# Patient Record
Sex: FEMALE | Race: WHITE | NOT HISPANIC OR LATINO | Employment: OTHER | URBAN - METROPOLITAN AREA
[De-identification: names, ages, dates, MRNs, and addresses within clinical notes are randomized per-mention and may not be internally consistent; named-entity substitution may affect disease eponyms.]

---

## 2017-02-04 ENCOUNTER — HOSPITAL ENCOUNTER (OUTPATIENT)
Dept: RADIOLOGY | Age: 62
Discharge: HOME | End: 2017-02-04
Payer: COMMERCIAL

## 2017-02-04 DIAGNOSIS — M81.0 AGE-RELATED OSTEOPOROSIS WITHOUT CURRENT PATHOLOGICAL FRACTURE: ICD-10-CM

## 2017-02-04 PROCEDURE — 76977 US BONE DENSITY MEASURE: CPT

## 2017-02-06 ENCOUNTER — GENERIC CONVERSION - ENCOUNTER (OUTPATIENT)
Dept: OTHER | Facility: OTHER | Age: 62
End: 2017-02-06

## 2017-06-13 ENCOUNTER — GENERIC CONVERSION - ENCOUNTER (OUTPATIENT)
Dept: OTHER | Facility: OTHER | Age: 62
End: 2017-06-13

## 2017-11-22 ENCOUNTER — ALLSCRIPTS OFFICE VISIT (OUTPATIENT)
Dept: OTHER | Facility: OTHER | Age: 62
End: 2017-11-22

## 2017-11-22 PROCEDURE — G0145 SCR C/V CYTO,THINLAYER,RESCR: HCPCS | Performed by: OBSTETRICS & GYNECOLOGY

## 2017-11-23 NOTE — PROGRESS NOTES
Assessment    1  Visit for screening mammogram (V76 12) (Z12 31)   2  Encounter for routine gynecological examination (V72 31) (Z01 419)  Pelvic exam revealed the uterus to be anterior mobile normal size and well supported  No ovarian masses are detected  Cervix is normal to appearance  There is no blood or discharge in the vagina  The vulva is normal  Rectal exam shows no blood or masses in the rectum and no nodularity in the cul-de-sac  Breast exam is normal    Plan  Encounter for routine gynecological examination    · (1) THIN PREP PAP WITH IMAGING; Status:Active - Retrospective Authorization; Requested for:[id number];    Perform:[facility]; JLQ:[id number]; Last Updated By:Frantz Sanderson; 11/22/2017 11:26:01 AM;Ordered;for routine gynecological examination; Ordered By:Roberth Rangel; Maturation index required? : No  :   HPV? : if ASCUS   · Follow-up visit in 1 year Evaluation and Treatment  Follow-up  Status: Hold For -Scheduling  Requested for: 23EFG3880   Ordered; For: Encounter for routine gynecological examination; Ordered By: Chidi Yanez Performed:  Due: 59HFS4256  Encounter for screening mammogram for malignant neoplasm of breast    · * MAMMO SCREENING BILATERAL W CAD; Status:Active; Requested DXW:[id number]; Perform:[facility]; ENS:98PKH3553;MZPISTG;TTW screening mammogram for malignant neoplasm of breast; Ordered By:Roberth Rangel; A cervical Pap smear was taken at this visit  The patient was given a slip for bilateral screening mammogram to be performed after June 12, 2018  She will continue with Estrace vaginal cream 1 g in the vagina twice a week  She will return in one year for follow-up exam    Discussion/Summary    The patient was informed that her breast and pelvic exam are normal  She will call she sees any vaginal bleeding over the next year        Chief Complaint  annual exam      History of Present Illness  HPI: This 78-year-old patient has had no vaginal bleeding or episodes of vaginitis over the past year  She does use Estrace vaginal cream 1 g in the vagina twice a week  She has no pain with intercourse  She does have mild hot flashes but no chronic headaches or fainting spells  She did have a heel osteoporosis test this past year which was in the normal range  She had a mammogram June 13, 2017 which was normal  Her last Pap smear was normal  She does have a blood pressure 144/94 today  I did suggest that she see an internist to evaluate this  She sleeps about 7 hours at night  She has normal bowel and bladder function  GYN , Adult Female [facility]:   General Health:  Lifestyle:  She does not use tobacco -- She consumes alcohol -- She denies drug use  Reproductive health: the patient is postmenopausal--   she reports no menstrual problems  -- she uses no contraception  -- she is sexually active  -- pregnancy history:  Screening: Cervical cancer screening includes a pap smear performed 11/23/2016  Breast cancer screening includes a mammogram performed 6/13/2017  Colorectal cancer screening includes uncertain timing of the last colonoscopy  Review of Systems   Constitutional: No fever, no chills, feels well, no tiredness, no recent weight gain or loss  ENT: no ear ache, no loss of hearing, no nosebleeds or nasal discharge, no sore throat or hoarseness  Cardiovascular: no complaints of slow or fast heart rate, no chest pain, no palpitations, no leg claudication or lower extremity edema  Respiratory: no complaints of shortness of breath, no wheezing, no dyspnea on exertion, no orthopnea or PND  Breasts: no complaints of breast pain, breast lump or nipple discharge  Gastrointestinal: no complaints of abdominal pain, no constipation, no nausea or diarrhea, no vomiting, no bloody stools  Genitourinary: no complaints of dysuria, no incontinence, no pelvic pain, no dysmenorrhea, no vaginal discharge or abnormal vaginal bleeding  Musculoskeletal: no complaints of arthralgia, no myalgia, no joint swelling or stiffness, no limb pain or swelling  Integumentary: no complaints of skin rash or lesion, no itching or dry skin, no skin wounds  Neurological: no complaints of headache, no confusion, no numbness or tingling, no dizziness or fainting  Active Problems    1  Age related osteoporosis (733 01) (M81 0)   2  Candidiasis (112 9) (B37 9)   3  Encounter for routine gynecological examination (V72 31) (Z01 419)   4  Encounter for screening mammogram for malignant neoplasm of breast (V76 12) (Z12 31)   5  Menopause (627 2) (Z78 0)   6  Vaginitis, atrophic (627 3) (N95 2)   7  Visit for screening mammogram (V76 12) (Z12 31)    Past Medical History   · History of Asymptomatic menopausal state (V49 81) (Z78 0)    Family History  Mother    · Family history of osteoporosis (V17 81) (Z80 61)  Father    · Family history of Diabetes Mellitus (V18 0)  Sister    · Family history of Asthma (V17 5)  Family History    · Family history of Carcinoma Of The Stomach (V16 0)   · Family history of Hypertensive Heart Disease (V17 49)   · Family history of Stroke Syndrome (V17 1)    Social History     · Never A Smoker    Current Meds   1  Estrace 0 1 MG/GM Vaginal Cream; INSERT 1 APPLICATORFUL INTRAVAGINALLY TWICE WEEKLY; Therapy: 67YHD6183 to (Evaluate:13Oct2016)  Requested for: 35LDR9965; Last Rx:19Oct2015 Ordered   2  Fluconazole 150 MG Oral Tablet; diflucan 150mg,,,take 1 today and repeat in 3 days; Therapy: 63IBV1200 to (Last Rx:07Mar2017)  Requested for: 06YRW5962 Ordered   3  Glucosamine Chondroit-Collagen Oral Capsule; Therapy: 60Pas1949 to Recorded   4  Restasis 0 05 % Ophthalmic Emulsion; Therapy: 68TLI4393 to (Last Rx:38Apd6044)  Requested for: 99BLE2049 Ordered   5  Vitamin D CAPS; Therapy: (Recorded:22Nov2017) to Recorded   6  Vitamin E 100 UNIT Oral Tablet; Therapy: 53Rpu0072 to Recorded    Allergies  1   No Known Drug Allergies    Vitals   Recorded: 13RKF1999 91:71NC   Systolic 670   Diastolic 94   Height 5 ft 4 in   Weight 155 lb    BMI Calculated 26 61   BSA Calculated 1 76   LMP        Physical Exam   Constitutional  General appearance: No acute distress, well appearing and well nourished  Neck  Neck: Normal, supple, trachea midline, no masses  Thyroid: Normal, no thyromegaly  Pulmonary  Respiratory effort: No increased work of breathing or signs of respiratory distress  Auscultation of lungs: Clear to auscultation  Cardiovascular  Auscultation of heart: Normal rate and rhythm, normal S1 and S2, no murmurs  Peripheral vascular exam: Normal pulses Throughout  Genitourinary  External genitalia: Normal and no lesions appreciated  Vagina: Normal, no lesions or dryness appreciated  Urethra: Normal    Urethral meatus: Normal    Bladder: Normal, soft, non-tender and no prolapse or masses appreciated  Cervix: Normal, no palpable masses  Uterus: Normal, non-tender, not enlarged, and no palpable masses  Anus, perineum, and rectum: Normal sphincter tone, no masses, and no prolapse  Chest  Breasts: Normal and no dimpling or skin changes noted  Abdomen  Abdomen: Normal, non-tender, and no organomegaly noted  Liver and spleen: No hepatomegaly or splenomegaly  Examination for hernias: No hernias appreciated  Lymphatic  Palpation of lymph nodes in neck, axillae, groin and/or other locations: No lymphadenopathy or masses noted  Skin  Skin and subcutaneous tissue: Normal skin turgor and no rashes     Palpation of skin and subcutaneous tissue: Normal    Psychiatric  Orientation to person, place, and time: Normal    Mood and affect: Normal        Signatures   Electronically signed by : Lois Johnson MD; Nov 22 2017 11:46AM EST                       (Author)

## 2017-11-24 ENCOUNTER — LAB REQUISITION (OUTPATIENT)
Dept: LAB | Facility: HOSPITAL | Age: 62
End: 2017-11-24
Payer: COMMERCIAL

## 2017-11-24 DIAGNOSIS — Z01.419 ENCOUNTER FOR GYNECOLOGICAL EXAMINATION WITHOUT ABNORMAL FINDING: ICD-10-CM

## 2017-12-04 LAB
LAB AP GYN PRIMARY INTERPRETATION: NORMAL
Lab: NORMAL

## 2017-12-05 ENCOUNTER — GENERIC CONVERSION - ENCOUNTER (OUTPATIENT)
Dept: OTHER | Facility: OTHER | Age: 62
End: 2017-12-05

## 2018-01-10 NOTE — MISCELLANEOUS
Message   Recorded as Task   Date: 03/07/2017 03:18 PM, Created By: Lindsay Jorgensen   Task Name: Medical Complaint Callback   Assigned To: Marybeth Charlette   Regarding Patient: Shannan Moncada, Status: In Progress   Comment:    Cynthia Flores - 07 Mar 2017 3:18 PM     TASK CREATED  Caller: Self; Medical Complaint; [phone] (Home)  Pt was taking antibiotics for bronchitis and now has yeast infection  Pt would like rx called in if possible   Bambi Cortez - 07 Mar 2017 3:21 PM     TASK IN PROGRESS   Bambi Cortez - 07 Mar 2017 3:26 PM     TASK EDITED  rx to her for fluconazole        Active Problems    1  Age related osteoporosis (733 01) (M81 0)   2  Candidiasis (112 9) (B37 9)   3  Encounter for routine gynecological examination (V72 31) (Z01 419)   4  Encounter for screening mammogram for malignant neoplasm of breast (V76 12)   (Z12 31)   5  Menopause (627 2) (Z78 0)   6  Vaginitis, atrophic (627 3) (N95 2)   7  Visit for screening mammogram (V76 12) (Z12 31)    Current Meds   1  Estrace 0 1 MG/GM Vaginal Cream; INSERT 1 APPLICATORFUL INTRAVAGINALLY   TWICE WEEKLY; Therapy: 35IUB5867 to (Evaluate:13Oct2016)  Requested for: 40MFK3758; Last   Rx:44Nrh8368 Ordered   2  Glucosamine Chondroit-Collagen Oral Capsule; Therapy: 74Eye4398 to Recorded   3  Restasis 0 05 % Ophthalmic Emulsion; Therapy: 75TBO9909 to (Last Rx:70Dwz8690)  Requested for: 79GNR7773 Ordered   4  Vitamin E 100 UNIT Oral Tablet; Therapy: 72Xff7819 to Recorded    Allergies    1   No Known Drug Allergies    Plan  Candidiasis    · Fluconazole 150 MG Oral Tablet; diflucan 150mg,,,take 1 today and repeat in 3  days    Signatures   Electronically signed by : Cheyenne Vick, ; Mar  7 2017  3:26PM EST                       (Author)

## 2018-01-12 NOTE — RESULT NOTES
Verified Results  (B) PAP (REFLEX TO HPV PLUS WHEN ASC-US) 67OLG8719 09:26AM Bobby Emerson     Test Name Result Flag Reference   PAP, LIQUID-BASED NILM     DIAGNOSIS:            Negative for intraepithelial lesion or malignancy  ADEQUACY:             Satisfactory for evaluation / No endocervical                         component/transformation zone  identified  COMMENT:              The absence of an endocervical component was                         confirmed by an additional screening evaluation  This Pap smear was screened with the assistance                         of the FashiontrotPrep(TM) Imaging System and                         screened by a cytotechnologist   SPECIMEN SOURCE:      PAP (RFLX HPV PLUS WHENASC-US), CERVIX  CLINICAL INFORMATION: LMP: N/A                        Post menopausal                        Provided Diagnosis Codes: V14 831                                                Cervicovaginal cytology should be considered a                         screening procedure subject to false negatives                         and false positives  Results are more reliable                         when a satisfactory sample is obtained on a                         regular repetitive basis, and should be                         interpreted together with past and current                         clinical data  ELECTRONICALLY SIGNED   BY:                   Screened By: WAN Hernandez (ASCP)   Case                         Electronically Signed 11/29/2016       Plan  Age related osteoporosis    · DXA [address]; Status:Active;  Requested for:29Nov2016;

## 2018-01-13 VITALS
BODY MASS INDEX: 26.46 KG/M2 | SYSTOLIC BLOOD PRESSURE: 144 MMHG | WEIGHT: 155 LBS | DIASTOLIC BLOOD PRESSURE: 94 MMHG | HEIGHT: 64 IN

## 2018-01-16 NOTE — MISCELLANEOUS
Message   Recorded as Task   Date: 02/06/2017 03:03 PM, Created By: Simone Duran   Task Name: Follow Up   Assigned To: Ceasar Fee   Regarding Patient: Lavanda Dakin, Status: In Progress   CommentSheryl Kolton - 06 Feb 2017 3:03 PM     TASK CREATED  Call Richie Botello her bone density test is normal    Marylene Noon - 06 Feb 2017 3:11 PM     TASK IN PROGRESS   Marylene Noon - 06 Feb 2017 3:12 PM     TASK EDITED  I lm of this for pt - 20 [facility] test        Active Problems    1  Age related osteoporosis (733 01) (M81 0)   2  Encounter for routine gynecological examination (V72 31) (Z01 419)   3  Encounter for screening mammogram for malignant neoplasm of breast (V76 12)   (Z12 31)   4  Menopause (627 2) (Z78 0)   5  Vaginitis, atrophic (627 3) (N95 2)   6  Visit for screening mammogram (V76 12) (Z12 31)    Current Meds   1  Estrace 0 1 MG/GM Vaginal Cream; INSERT 1 APPLICATORFUL INTRAVAGINALLY   TWICE WEEKLY; Therapy: 30LZM2755 to (Evaluate:94Lft4942)  Requested for: 09KPF6469; Last   Rx:69Ifc5753 Ordered   2  Glucosamine Chondroit-Collagen Oral Capsule; Therapy: 79Faq2033 to Recorded   3  Restasis 0 05 % Ophthalmic Emulsion; Therapy: 18TOQ6727 to (Last Rx:47Neu5124)  Requested for: 71XAN6799 Ordered   4  Vitamin E 100 UNIT Oral Tablet; Therapy: 74Zcm7232 to Recorded    Allergies    1  No Known Drug Allergies    Signatures   Electronically signed by :  Leela Al, ; Feb 6 2017  3:12PM EST                       (Author)

## 2018-01-16 NOTE — MISCELLANEOUS
Message   Recorded as Task   Date: 11/29/2016 11:41 AM, Created By: Carlitos Alexandre   Task Name: Follow Up   Assigned To: Nahid Ku   Regarding Patient: Yuriy Saldana, Status: In Progress   Kameron Lovell - 29 Nov 2016 11:41 AM     TASK CREATED  Call Tamra Whittington her vitamin D level is low she should take 2000 units of vitamin D daily  Iqra Isaac - 29 Nov 2016 11:41 AM     TASK IN PROGRESS   Iqra Isaac - 29 Nov 2016 11:42 AM     TASK EDITED  Miguel Montez - 29 Nov 2016 3:24 PM     TASK EDITED  made pt aware, she requested a heel scan rx in ehr number to ehr given to call and schedule apt  Active Problems    1  Age related osteoporosis (733 01) (M81 0)   2  Encounter for routine gynecological examination (V72 31) (Z01 419)   3  Encounter for screening mammogram for malignant neoplasm of breast (V76 12)   (Z12 31)   4  Menopause (627 2) (Z78 0)   5  Vaginitis, atrophic (627 3) (N95 2)   6  Visit for screening mammogram (V76 12) (Z12 31)    Current Meds   1  Estrace 0 1 MG/GM Vaginal Cream; INSERT 1 APPLICATORFUL INTRAVAGINALLY   TWICE WEEKLY; Therapy: 95JIG6134 to (Evaluate:13Oct2016)  Requested for: 60KHR0127; Last   Rx:19Oct2015 Ordered   2  Glucosamine Chondroit-Collagen Oral Capsule; Therapy: 22Jyd8143 to Recorded   3  Restasis 0 05 % Ophthalmic Emulsion; Therapy: 61XVT3371 to (Last Rx:11Klg8155)  Requested for: 97DAY1283 Ordered   4  Vitamin E 100 UNIT Oral Tablet; Therapy: 55Knq0800 to Recorded    Allergies    1  No Known Drug Allergies    Plan  Age related osteoporosis    · DXA US BONE DENSITY HEEL; Status:Hold For - Scheduling,Retrospective By Protocol  Authorization;  Requested for:29Nov2016;     Signatures   Electronically signed by : Stefania Willingham LPN; Nov 29 7551  6:53TG EST                       (Author)

## 2018-01-18 NOTE — RESULT NOTES
Verified Results  DXA [address] 84AWI1721 09:24AM Dudley Felix Order Number: [id number]    - Patient Instructions: To schedule this appointment, please contact Central Scheduling at [phone]  Test Name Result Flag Reference   DXA US BONE DENSITY HEEL (Report)     BONE SONOMETRY     INDICATION: Bone density screening     PROCEDURE: Ultrasound measurement of the right heel was performed using a [address]  FINDINGS:     Speed of sound: 1587 1 m/s    Broadband ultrasound attenuation: 123 3 dB/MHz    Quantitative ultrasound index/stiffness: 130 3    T score: 1 5    Estimated heel BMD: 0 747 g/cm2       IMPRESSION:     T score 1 5, within normal limits  No further evaluation recommended at this time  RECOMMENDATION:  Patients with T score of zero or lower should undergo central DEXA measurement to determine diagnostic category by WHO criteria  Workstation performed: [id number]     Signed by:   Mikki Yarbrough MD   2/6/17

## 2018-01-18 NOTE — RESULT NOTES
Message   Notify Bethalto Hazard a low vitamin D level     Verified Results  (1) VITAMIN D 25-HYDROXY 06ZJM2142 01:25PM Cathryn Jimenez Order Number: [id number]     Test Name Result Flag Reference   VIT D 25-HYDROX 24 8 ng/mL L 30 0-100 0   This assay is a certified procedure of the CDC Vitamin D Standardization Certification Program (VDSCP)     Deficiency <20ng/ml   Insufficiency 20-30ng/ml   Sufficient  ng/ml     *Patients undergoing fluorescein dye angiography may retain small amounts of fluorescein in the body for 48-72 hours post procedure  Samples containing fluorescein can produce falsely elevated Vitamin D values  If the patient had this procedure, a specimen should be resubmitted post fluorescein clearance

## 2018-01-23 NOTE — RESULT NOTES
Verified Results  (1) THIN PREP PAP WITH IMAGING 33YNX5472 08:14AM Brittney Barkley     Test Name Result Flag Reference   LAB AP CASE REPORT (Report)     Gynecologic Cytology Report            Case: [id number]                  Authorizing Provider: Corinne Garrido MD     Collected:      11/22/2017           First Screen:     WAN Bright Received:      11/27/2017 1440        Specimen:  LIQUID-BASED PAP, SCREENING, Cervix   LAB AP GYN PRIMARY INTERPRETATION      Negative for intraepithelial lesion or malignancy  Electronically signed by WAN Bright on 12/4/2017 at 8:14 AM   LAB AP GYN SPECIMEN ADEQUACY      Satisfactory for evaluation  Absence of endocervical/transformation zone component  LAB AP GYN ADDITIONAL INFORMATION (Report)     [employer] FDA approved ,  and ThinPrep Imaging System are   utilized with strict adherence to the 's instruction manual to   prepare gynecologic and non-gynecologic cytology specimens for the   production of ThinPrep slides as well as for gynecologic ThinPrep imaging  These processes have been validated by our laboratory and/or by the     The Pap test is not a diagnostic procedure and should not be used as the   sole means to detect cervical cancer  It is only a screening procedure to   aid in the detection of cervical cancer and its precursors  Both   false-negative and false-positive results have been experienced  Your   patient's test result should be interpreted in this context together with   the history and clinical findings     LAB AP LMP

## 2018-06-13 DIAGNOSIS — Z12.31 ENCOUNTER FOR SCREENING MAMMOGRAM FOR MALIGNANT NEOPLASM OF BREAST: ICD-10-CM

## 2018-06-26 DIAGNOSIS — B37.9 YEAST INFECTION: Primary | ICD-10-CM

## 2018-06-26 RX ORDER — FLUCONAZOLE 150 MG/1
TABLET ORAL
Qty: 2 TABLET | Refills: 0 | Status: SHIPPED | OUTPATIENT
Start: 2018-06-26 | End: 2018-06-30

## 2018-08-17 DIAGNOSIS — Z78.0 MENOPAUSE: Primary | ICD-10-CM

## 2018-08-17 RX ORDER — ESTRADIOL 0.1 MG/G
CREAM VAGINAL
COMMUNITY
Start: 2015-10-19 | End: 2018-08-17 | Stop reason: SDUPTHER

## 2018-08-17 RX ORDER — ESTRADIOL 0.1 MG/G
1 CREAM VAGINAL 2 TIMES WEEKLY
Qty: 42.5 G | Refills: 1 | Status: SHIPPED | OUTPATIENT
Start: 2018-08-20

## 2018-11-13 DIAGNOSIS — B37.9 YEAST INFECTION: Primary | ICD-10-CM

## 2018-11-14 RX ORDER — FLUCONAZOLE 150 MG/1
TABLET ORAL
Qty: 2 TABLET | Refills: 0 | Status: SHIPPED | OUTPATIENT
Start: 2018-11-14 | End: 2018-11-17

## 2018-11-14 NOTE — TELEPHONE ENCOUNTER
you cassandra QUARLESI of Proverahe but she alondra approve it does not mean it is okay dieting the [address] the [address] and I had a thehehe fromver come

## 2019-06-25 ENCOUNTER — TELEPHONE (OUTPATIENT)
Dept: OBGYN CLINIC | Facility: CLINIC | Age: 64
End: 2019-06-25

## 2019-06-25 DIAGNOSIS — N76.0 VAGINITIS AND VULVOVAGINITIS: Primary | ICD-10-CM

## 2019-06-25 RX ORDER — FLUCONAZOLE 150 MG/1
TABLET ORAL
Qty: 2 TABLET | Refills: 0 | Status: SHIPPED | OUTPATIENT
Start: 2019-06-25 | End: 2019-06-29

## 2019-07-29 ENCOUNTER — ANNUAL EXAM (OUTPATIENT)
Dept: OBGYN CLINIC | Facility: CLINIC | Age: 64
End: 2019-07-29
Payer: COMMERCIAL

## 2019-07-29 VITALS
DIASTOLIC BLOOD PRESSURE: 76 MMHG | SYSTOLIC BLOOD PRESSURE: 128 MMHG | BODY MASS INDEX: 27.16 KG/M2 | HEIGHT: 65 IN | WEIGHT: 163 LBS

## 2019-07-29 DIAGNOSIS — N60.01 SOLITARY CYST OF RIGHT BREAST: Primary | ICD-10-CM

## 2019-07-29 DIAGNOSIS — Z01.419 ENCOUNTER FOR GYNECOLOGICAL EXAMINATION (GENERAL) (ROUTINE) WITHOUT ABNORMAL FINDINGS: ICD-10-CM

## 2019-07-29 DIAGNOSIS — Z12.31 ENCOUNTER FOR SCREENING MAMMOGRAM FOR MALIGNANT NEOPLASM OF BREAST: ICD-10-CM

## 2019-07-29 PROCEDURE — S0612 ANNUAL GYNECOLOGICAL EXAMINA: HCPCS | Performed by: OBSTETRICS & GYNECOLOGY

## 2019-07-29 PROCEDURE — G0145 SCR C/V CYTO,THINLAYER,RESCR: HCPCS | Performed by: OBSTETRICS & GYNECOLOGY

## 2019-07-29 RX ORDER — LEVOTHYROXINE SODIUM 0.05 MG/1
TABLET ORAL DAILY
COMMUNITY

## 2019-07-29 RX ORDER — ASCORBATE CALCIUM 500 MG
TABLET ORAL
COMMUNITY
Start: 2013-08-20

## 2019-07-29 RX ORDER — AMLODIPINE BESYLATE 2.5 MG/1
2.5 TABLET ORAL DAILY
Refills: 8 | COMMUNITY
Start: 2019-07-10 | End: 2021-11-24

## 2019-07-29 RX ORDER — CYCLOSPORINE 0.5 MG/ML
EMULSION OPHTHALMIC EVERY 12 HOURS
COMMUNITY
End: 2021-11-24

## 2019-07-29 NOTE — PATIENT INSTRUCTIONS
The patient was told that her breast and pelvic exam are normal   I will give her a prescription for Diflucan 150 mg take 1 stat and repeat in 48 hours with 6 refills  This can be used if she has had a Monilia vaginal infection after taking antibiotics  She will be seen in 1 year for followup gyn evaluation

## 2019-07-29 NOTE — PROGRESS NOTES
Assessment/Plan: This 69-year-old patient has had no vaginal bleeding over the past year  She occasionally has episodes of monilia vaginal infections especially after using antibiotics  She has taken Diflucan for relief of the symptoms  No problem-specific Assessment & Plan notes found for this encounter  Subjective:      Patient ID: Johnna Hawkins is a 70 y o  female  This 69-year-old patient has had no vaginal bleeding over the past year  She does have Monilia infections after taking antibiotics and receives Diflucan to treat this  [place] is comfortable  She does use estradiol vaginal cream 1 g in the vagina twice a week and receives a prescription from her PCP in [place]  She does have occasional hot flashes but no chronic headaches or fainting spells  Her bowel function is normal and she does not use laxatives on a regular basis  June 5, 2019 she did have a mammogram which showed a cyst in the right breast   It was recommended that she have a ultrasound of the right breast to re-evaluate this cyst in 6 months  Compared about a year and a half ago her weight did go up 8 lb to 163  Her blood pressure is 128/74 with the help of medication  She does not wear liners for urine stress incontinence  She has had no urinary tract infections or kidney stones over the past year  Review of Systems   Constitutional: Negative  HENT: Negative  Eyes: Negative  Respiratory: Negative  Cardiovascular: Negative  Gastrointestinal: Negative  Endocrine: Negative  Genitourinary: Negative  Musculoskeletal: Negative  Skin: Negative  Allergic/Immunologic: Negative  Neurological: Negative  Hematological: Negative  Psychiatric/Behavioral: Negative            Objective:      /76 (BP Location: Left arm, Patient Position: Sitting, Cuff Size: Standard)   Ht 5' 5" (1 651 m)   Wt 73 9 kg (163 lb)   BMI 27 12 kg/m²          Physical Exam   Constitutional: She is oriented to person, place, and time  She appears well-developed and well-nourished  HENT:   Head: Normocephalic  Neck: Normal range of motion  Neck supple  Cardiovascular: Normal rate, regular rhythm, normal heart sounds and intact distal pulses  Pulmonary/Chest: Effort normal and breath sounds normal    Abdominal: Soft  Bowel sounds are normal    Genitourinary: Uterus normal    Musculoskeletal: Normal range of motion  Neurological: She is alert and oriented to person, place, and time  Skin: Skin is warm and dry  Psychiatric: She has a normal mood and affect  Nursing note and vitals reviewed  breast exam is normal   Pelvic exam reveals uterus to be anterior mobile normal size and well supported  No adnexal masses are identified  The cervix is normal to appearance  There is no blood or discharge in the vagina  The vulva is normal   Rectal exam shows no masses or blood in the rectum and no nodularity in the cul-de-sac

## 2019-07-31 LAB
LAB AP GYN PRIMARY INTERPRETATION: NORMAL
Lab: NORMAL

## 2020-11-30 ENCOUNTER — ANNUAL EXAM (OUTPATIENT)
Dept: OBGYN CLINIC | Facility: CLINIC | Age: 65
End: 2020-11-30
Payer: COMMERCIAL

## 2020-11-30 VITALS
DIASTOLIC BLOOD PRESSURE: 115 MMHG | SYSTOLIC BLOOD PRESSURE: 150 MMHG | BODY MASS INDEX: 27.66 KG/M2 | HEIGHT: 65 IN | WEIGHT: 166 LBS

## 2020-11-30 DIAGNOSIS — Z12.11 SCREENING FOR COLON CANCER: ICD-10-CM

## 2020-11-30 DIAGNOSIS — N95.2 VAGINITIS, ATROPHIC: ICD-10-CM

## 2020-11-30 DIAGNOSIS — Z12.31 ENCOUNTER FOR SCREENING MAMMOGRAM FOR MALIGNANT NEOPLASM OF BREAST: ICD-10-CM

## 2020-11-30 DIAGNOSIS — I10 BENIGN HYPERTENSION: ICD-10-CM

## 2020-11-30 DIAGNOSIS — N60.09 BREAST CYST, UNSPECIFIED LATERALITY: ICD-10-CM

## 2020-11-30 DIAGNOSIS — Z01.419 ENCOUNTER FOR ANNUAL ROUTINE GYNECOLOGICAL EXAMINATION: Primary | ICD-10-CM

## 2020-11-30 DIAGNOSIS — E03.9 HYPOTHYROIDISM, UNSPECIFIED TYPE: ICD-10-CM

## 2020-11-30 PROCEDURE — S0612 ANNUAL GYNECOLOGICAL EXAMINA: HCPCS | Performed by: STUDENT IN AN ORGANIZED HEALTH CARE EDUCATION/TRAINING PROGRAM

## 2020-11-30 RX ORDER — AZELASTINE 1 MG/ML
SPRAY, METERED NASAL
COMMUNITY
Start: 2020-09-29 | End: 2020-11-30

## 2021-07-26 ENCOUNTER — LAB REQUISITION (OUTPATIENT)
Dept: LAB | Facility: HOSPITAL | Age: 66
End: 2021-07-26
Payer: COMMERCIAL

## 2021-07-26 DIAGNOSIS — Z12.11 ENCOUNTER FOR SCREENING FOR MALIGNANT NEOPLASM OF COLON: ICD-10-CM

## 2021-07-26 DIAGNOSIS — K57.30 DIVERTICULOSIS OF LARGE INTESTINE WITHOUT PERFORATION OR ABSCESS WITHOUT BLEEDING: ICD-10-CM

## 2021-07-26 DIAGNOSIS — D12.3 BENIGN NEOPLASM OF TRANSVERSE COLON: ICD-10-CM

## 2021-07-26 PROCEDURE — 88305 TISSUE EXAM BY PATHOLOGIST: CPT | Performed by: PATHOLOGY

## 2021-11-24 ENCOUNTER — ANNUAL EXAM (OUTPATIENT)
Dept: OBGYN CLINIC | Facility: CLINIC | Age: 66
End: 2021-11-24
Payer: COMMERCIAL

## 2021-11-24 VITALS
DIASTOLIC BLOOD PRESSURE: 94 MMHG | WEIGHT: 163.6 LBS | HEIGHT: 65 IN | SYSTOLIC BLOOD PRESSURE: 144 MMHG | BODY MASS INDEX: 27.26 KG/M2

## 2021-11-24 DIAGNOSIS — N95.2 VAGINITIS, ATROPHIC: ICD-10-CM

## 2021-11-24 DIAGNOSIS — Z13.820 SCREENING FOR OSTEOPOROSIS: ICD-10-CM

## 2021-11-24 DIAGNOSIS — Z12.31 ENCOUNTER FOR SCREENING MAMMOGRAM FOR MALIGNANT NEOPLASM OF BREAST: ICD-10-CM

## 2021-11-24 DIAGNOSIS — Z01.419 ENCOUNTER FOR ANNUAL ROUTINE GYNECOLOGICAL EXAMINATION: Primary | ICD-10-CM

## 2021-11-24 DIAGNOSIS — N60.09 BREAST CYST, UNSPECIFIED LATERALITY: ICD-10-CM

## 2021-11-24 DIAGNOSIS — E03.9 HYPOTHYROIDISM, UNSPECIFIED TYPE: ICD-10-CM

## 2021-11-24 DIAGNOSIS — Z78.0 POST-MENOPAUSAL: ICD-10-CM

## 2021-11-24 DIAGNOSIS — I10 BENIGN HYPERTENSION: ICD-10-CM

## 2021-11-24 PROCEDURE — S0612 ANNUAL GYNECOLOGICAL EXAMINA: HCPCS | Performed by: STUDENT IN AN ORGANIZED HEALTH CARE EDUCATION/TRAINING PROGRAM

## 2021-11-24 RX ORDER — EZETIMIBE 10 MG/1
TABLET ORAL
COMMUNITY
Start: 2021-11-15

## 2021-11-24 RX ORDER — CYCLOSPORINE 0 G/ML
SOLUTION/ DROPS OPHTHALMIC; TOPICAL
COMMUNITY
Start: 2021-09-14

## 2021-11-24 RX ORDER — AMLODIPINE BESYLATE 5 MG/1
TABLET ORAL
COMMUNITY
Start: 2021-11-13

## 2022-06-09 ENCOUNTER — TELEPHONE (OUTPATIENT)
Dept: OBGYN CLINIC | Facility: CLINIC | Age: 67
End: 2022-06-09

## 2022-06-09 DIAGNOSIS — Z13.820 SCREENING FOR OSTEOPOROSIS: ICD-10-CM

## 2022-06-09 DIAGNOSIS — Z12.31 ENCOUNTER FOR SCREENING MAMMOGRAM FOR MALIGNANT NEOPLASM OF BREAST: ICD-10-CM

## 2022-06-09 DIAGNOSIS — Z78.0 POST-MENOPAUSAL: ICD-10-CM

## 2022-06-09 NOTE — TELEPHONE ENCOUNTER
Left voice message after checking her communication consent of the following results:  DEXA scan shows low bone mineral density (osteopenia)  Recommend the following: DEXA scan to be repeated in 1-2 years, daily weight bearing exercises, avoidance of falls, alcohol ingestion in moderation and adequate calcium and Vit D intake  Please call office with any questions       Also notified of normal screening mammogram and recommendation to continue with annual mammogram

## 2022-06-09 NOTE — TELEPHONE ENCOUNTER
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

## 2023-06-08 NOTE — PROGRESS NOTES
Kala Chandler   1955    CC:  Yearly exam    A:  Yearly exam      Problem List Items Addressed This Visit    None  Visit Diagnoses     Encounter for screening mammogram for malignant neoplasm of breast    -  Primary    Relevant Orders    Mammo screening bilateral w 3d & cad    Menopause        Relevant Medications    estradiol (ESTRACE VAGINAL) 0 1 mg/g vaginal cream (Start on 2023)          P:   Pap testing up to date  We reviewed ASCCP guidelines for Pap testing  Mammo slip given    Colonoscopy due     RTO one year for yearly exam or sooner as needed  S:  69 y o  female here for yearly exam  She is postmenopausal and has had no vaginal bleeding  She denies vaginal discharge, itching, odor or dryness  Sexual activity: She is sexually active without pain, bleeding or dryness  Previously with dryness, prior to Estrace, but this keeps it managed well  STD testing: She does not want STD testing today  Menopausal    Last Pap: 2019 NILM    - no hx abnormal  Last Mammo: 2022 BIRADS 1  Last Colonoscopy: 2021, 5yr recall  Last DEXA: 2022 osteopenia  PCP would like to treat as osteoporosis and she is in the pre-authorization process    Doing well: spending the summer in the [facility] as her  is travelling with the [id number] and her daughter is pregnant       Non-smoker, social drinker    Family hx of breast/ovarian/colon cancer: denies      Current Outpatient Medications:   •  amLODIPine (NORVASC) 5 mg tablet, , Disp: , Rfl:   •  Calcium 600-10 MG-MCG CHEW, Chew, Disp: , Rfl:   •  Cequa 0 09 % SOLN, , Disp: , Rfl:   •  [START ON 2023] estradiol (ESTRACE VAGINAL) 0 1 mg/g vaginal cream, Insert 1 g into the vagina 2 (two) times a week, Disp: 42 5 g, Rfl: 1  •  ezetimibe (ZETIA) 10 mg tablet, , Disp: , Rfl:   •  levothyroxine 50 mcg tablet, Daily, Disp: , Rfl:   •  Vitamin D, Cholecalciferol, 50 MCG (2000) CAPS, Take by mouth, Disp: , Rfl:   • "Vitamin E 100 units TABS, Take by mouth, Disp: , Rfl:   •  Glucosamine-Chondroitin--200-150 MG TABS, Take by mouth (Patient not taking: Reported on 6/9/2023), Disp: , Rfl:   Social History     Socioeconomic History   • Marital status: /Civil Union     Spouse name: Not on file   • Number of children: Not on file   • Years of education: Not on file   • Highest education level: Not on file   Occupational History   • Not on file   Tobacco Use   • Smoking status: Never   • Smokeless tobacco: Never   Vaping Use   • Vaping Use: Never used   Substance and Sexual Activity   • Alcohol use: Yes     Alcohol/week: 4 0 standard drinks of alcohol     Types: 4 Glasses of wine per week     Comment: social   • Drug use: Never   • Sexual activity: Yes     Partners: Male     Birth control/protection: Post-menopausal   Other Topics Concern   • Not on file   Social History Narrative   • Not on file     Social Determinants of Health     Financial Resource Strain: Not on file   Food Insecurity: Not on file   Transportation Needs: Not on file   Physical Activity: Not on file   Stress: Not on file   Social Connections: Not on file   Intimate Partner Violence: Not on file   Housing Stability: Not on file     Family History   Problem Relation Age of Onset   • Osteoporosis Mother    • Arthritis Mother    • Osteoarthritis Mother    • Diabetes Father    • Heart attack Father    • Hypertension Father      Past Medical History:   Diagnosis Date   • Disease of thyroid gland    • Hypertension    • Hypothyroidism    • Miscarriage         Review of Systems   Respiratory: Negative  Cardiovascular: Negative  Gastrointestinal: Negative for constipation and diarrhea  Genitourinary: Negative for difficulty urinating, pelvic pain, vaginal bleeding, vaginal discharge, itching or odor  O:  Blood pressure 148/92, height 5' 4 5\" (1 638 m), weight 75 2 kg (165 lb 12 8 oz)      Patient appears well and is not in distress  Neck is supple " without masses  Breasts are symmetrical without mass, tenderness, nipple discharge, skin changes or adenopathy  Abdomen is soft and nontender without masses  Vulva without lesions or rashes  Urethral meatus and urethra are normal  Bladder is normal to palpation  Vagina is normal without discharge or bleeding  Cervix is normal without discharge or lesion  Uterus and adnexa are normal, mobile, nontender without palpable mass    Rectovaginal exam without nodularity/masses/visible blood on glove

## 2023-06-09 ENCOUNTER — ANNUAL EXAM (OUTPATIENT)
Dept: OBGYN CLINIC | Facility: CLINIC | Age: 68
End: 2023-06-09
Payer: COMMERCIAL

## 2023-06-09 VITALS
WEIGHT: 165.8 LBS | BODY MASS INDEX: 27.63 KG/M2 | DIASTOLIC BLOOD PRESSURE: 92 MMHG | SYSTOLIC BLOOD PRESSURE: 148 MMHG | HEIGHT: 65 IN

## 2023-06-09 DIAGNOSIS — Z78.0 MENOPAUSE: ICD-10-CM

## 2023-06-09 DIAGNOSIS — Z12.31 ENCOUNTER FOR SCREENING MAMMOGRAM FOR MALIGNANT NEOPLASM OF BREAST: Primary | ICD-10-CM

## 2023-06-09 PROCEDURE — S0612 ANNUAL GYNECOLOGICAL EXAMINA: HCPCS | Performed by: STUDENT IN AN ORGANIZED HEALTH CARE EDUCATION/TRAINING PROGRAM

## 2023-06-09 RX ORDER — MULTIVIT-MIN/IRON/FOLIC ACID/K 18-600-40
CAPSULE ORAL
COMMUNITY

## 2023-06-09 RX ORDER — ESTRADIOL 0.1 MG/G
1 CREAM VAGINAL 2 TIMES WEEKLY
Qty: 42.5 G | Refills: 1 | Status: SHIPPED | OUTPATIENT
Start: 2023-06-12

## 2023-06-12 DIAGNOSIS — Z12.31 ENCOUNTER FOR SCREENING MAMMOGRAM FOR MALIGNANT NEOPLASM OF BREAST: ICD-10-CM

## 2024-06-10 NOTE — PROGRESS NOTES
Esmer Chandler   1955    CC:  Yearly exam    A:  Yearly exam.     Problem List Items Addressed This Visit    None  Visit Diagnoses       Encounter for screening mammogram for malignant neoplasm of breast    -  Primary    Relevant Orders    Mammo screening bilateral w 3d & cad    Encounter for gynecological examination (general) (routine) without abnormal findings        Osteopenia, unspecified location        Relevant Orders    DXA bone density spine hip and pelvis    Menopause        Relevant Medications    estradiol (ESTRACE VAGINAL) 0.1 mg/g vaginal cream (Start on 2024)            P:   Pap testing complete   Mammo slip given   Colonoscopy due    DEXA due, ordered    RTO one year for yearly exam or sooner as needed.      S:  68 y.o. female here for yearly exam. She is postmenopausal and has had no vaginal bleeding.  She denies vaginal discharge, itching, odor or dryness.     Sexual activity: She is sexually active without pain, bleeding or dryness.     STD testing: She does not want STD testing today.     Menopausal    Last Pap: 2019 NILM   Last Mammo: 2023 BIRADS 1  Last Colonoscopy: 2021, 5yr recall   Last DEXA: 2022 osteopenia     Non-smoker, social drinker  Exercises regularly    Family hx of breast cancer: denies  Family hx of ovarian cancer: denies  Family hx of colon cancer: denies       Current Outpatient Medications:     amLODIPine (NORVASC) 5 mg tablet, , Disp: , Rfl:     Cequa 0.09 % SOLN, , Disp: , Rfl:     denosumab (Prolia) 60 mg/mL, Inject 60 mg under the skin, Disp: , Rfl:     [START ON 2024] estradiol (ESTRACE VAGINAL) 0.1 mg/g vaginal cream, Insert 1 g into the vagina 2 (two) times a week, Disp: 42.5 g, Rfl: 3    ezetimibe (ZETIA) 10 mg tablet, , Disp: , Rfl:     levothyroxine 50 mcg tablet, Daily, Disp: , Rfl:     Vitamin D, Cholecalciferol, 50 MCG (2000) CAPS, Take by mouth, Disp: , Rfl:     Vitamin E 100 units TABS, Take by mouth, Disp: , Rfl:     Calcium  600-10 MG-MCG CHEW, Chew (Patient not taking: Reported on 6/12/2024), Disp: , Rfl:     Glucosamine-Chondroitin--200-150 MG TABS, Take by mouth (Patient not taking: Reported on 6/9/2023), Disp: , Rfl:   Social History     Socioeconomic History    Marital status: /Civil Union     Spouse name: Not on file    Number of children: Not on file    Years of education: Not on file    Highest education level: Not on file   Occupational History    Not on file   Tobacco Use    Smoking status: Never    Smokeless tobacco: Never   Vaping Use    Vaping status: Never Used   Substance and Sexual Activity    Alcohol use: Yes     Alcohol/week: 4.0 standard drinks of alcohol     Types: 4 Glasses of wine per week     Comment: social    Drug use: Never    Sexual activity: Yes     Partners: Male     Birth control/protection: Post-menopausal   Other Topics Concern    Not on file   Social History Narrative    Not on file     Social Determinants of Health     Financial Resource Strain: Not on file   Food Insecurity: Not on file   Transportation Needs: Not on file   Physical Activity: Not on file   Stress: Not on file   Social Connections: Unknown (5/18/2024)    Received from [facility] Short Social Needs Screening - Social Connection     Would you like help with any of the following needs: food, medicine/medical supplies, transportation, loneliness, housing or utilities?: Not on file   Intimate Partner Violence: Not on file   Housing Stability: Not on file     Family History   Problem Relation Age of Onset    Osteoporosis Mother     Arthritis Mother     Osteoarthritis Mother     Diabetes Father     Heart attack Father     Hypertension Father      Past Medical History:   Diagnosis Date    Disease of thyroid gland     Hypertension     Hypothyroidism     Miscarriage         Review of Systems   Respiratory: Negative.    Cardiovascular: Negative.    Gastrointestinal: Negative for constipation and diarrhea.   Genitourinary:  "Negative for difficulty urinating, pelvic pain, vaginal bleeding, vaginal discharge, itching or odor.    O:  Blood pressure 120/70, height 5' 4\" (1.626 m), weight 75.9 kg (167 lb 6.4 oz).    Patient appears well and is not in distress  Neck is supple without masses  Breasts are symmetrical without mass, tenderness, nipple discharge, skin changes or adenopathy.   Abdomen is soft and nontender without masses.   Vulva without lesions or rashes.  Urethral meatus and urethra are normal  Bladder is normal to palpation  Vagina is normal without discharge or bleeding.   Cervix is normal without discharge or lesion.   Uterus and adnexa are normal, mobile, nontender without palpable mass.  Rectovaginal exam without nodularity/masses/visible blood on glove   "

## 2024-06-12 ENCOUNTER — ANNUAL EXAM (OUTPATIENT)
Dept: OBGYN CLINIC | Facility: CLINIC | Age: 69
End: 2024-06-12
Payer: COMMERCIAL

## 2024-06-12 VITALS
SYSTOLIC BLOOD PRESSURE: 120 MMHG | HEIGHT: 64 IN | BODY MASS INDEX: 28.58 KG/M2 | DIASTOLIC BLOOD PRESSURE: 70 MMHG | WEIGHT: 167.4 LBS

## 2024-06-12 DIAGNOSIS — Z12.31 ENCOUNTER FOR SCREENING MAMMOGRAM FOR MALIGNANT NEOPLASM OF BREAST: Primary | ICD-10-CM

## 2024-06-12 DIAGNOSIS — Z01.419 ENCOUNTER FOR GYNECOLOGICAL EXAMINATION (GENERAL) (ROUTINE) WITHOUT ABNORMAL FINDINGS: ICD-10-CM

## 2024-06-12 DIAGNOSIS — M85.80 OSTEOPENIA, UNSPECIFIED LOCATION: ICD-10-CM

## 2024-06-12 DIAGNOSIS — Z78.0 MENOPAUSE: ICD-10-CM

## 2024-06-12 PROCEDURE — S0612 ANNUAL GYNECOLOGICAL EXAMINA: HCPCS | Performed by: STUDENT IN AN ORGANIZED HEALTH CARE EDUCATION/TRAINING PROGRAM

## 2024-06-12 RX ORDER — ESTRADIOL 0.1 MG/G
1 CREAM VAGINAL 2 TIMES WEEKLY
Qty: 42.5 G | Refills: 3 | Status: SHIPPED | OUTPATIENT
Start: 2024-06-13

## 2024-06-12 RX ORDER — DENOSUMAB 60 MG/ML
60 INJECTION SUBCUTANEOUS
COMMUNITY
Start: 2024-02-14

## 2024-06-24 ENCOUNTER — TELEPHONE (OUTPATIENT)
Age: 69
End: 2024-06-24

## 2024-06-24 NOTE — TELEPHONE ENCOUNTER
Patient called to see if Dr. Espinal would clearance form.  Told patient this is done by the PCP.  She states she knows but her PCP was seen 5/10/24 in [place] which is more than 30 days ago.  She was seen by Dr. Espinal on 6/12/24.  Surgery is scheduled 7/11/24   Please notify patient if this is possible.

## 2025-06-16 NOTE — PROGRESS NOTES
Esmer Chandler   1955    CC:  Yearly exam    A:  Yearly exam.     Problem List Items Addressed This Visit    None  Visit Diagnoses         Menopause        Relevant Medications    estradiol (ESTRACE VAGINAL) 0.1 mg/g vaginal cream (Start on 2025)            P:   Pap testing complete  Mammo scheduled 2025   Colonoscopy due    DEXA due next year    RTO one year for yearly exam or sooner as needed.      S:  69 y.o. female here for yearly exam. She is postmenopausal and has had no vaginal bleeding.  She denies vaginal discharge, itching, odor or dryness.     Sexual activity: She is sexually active without pain, bleeding or dryness, as long as she uses estrace    STD testing: She does not want STD testing today.     Menopausal    Last Pap: 2019 NILM  Last Mammo: 2024 BI-RADS 2  Last Colonoscopy: 2021- repeat in 10 years  Last DEXA: 2024 - Normal    Non-smoker, social drinker  Exercises regularly. Loves classes: pilates, HIIT, anything!  Doing very well. Has 3 grandchildren, who she loves to visit. Still working. Her  signed a 3 year contract, so she is unsure what her plans will be, but he does tend to be gone a lot and she likes to stay busy.     Family hx of breast cancer: no  Family hx of ovarian cancer: no  Family hx of colon cancer: no    Current Medications[1]  Social History     Socioeconomic History    Marital status: /Civil Union     Spouse name: Not on file    Number of children: Not on file    Years of education: Not on file    Highest education level: Not on file   Occupational History    Not on file   Tobacco Use    Smoking status: Never    Smokeless tobacco: Never   Vaping Use    Vaping status: Never Used   Substance and Sexual Activity    Alcohol use: Yes     Alcohol/week: 4.0 standard drinks of alcohol     Types: 4 Glasses of wine per week     Comment: social    Drug use: Never    Sexual activity: Yes     Partners: Male     Birth  "control/protection: Post-menopausal   Other Topics Concern    Not on file   Social History Narrative    Not on file     Social Drivers of Health     Financial Resource Strain: Not on file   Food Insecurity: Not on file   Transportation Needs: Not on file   Physical Activity: Not on file   Stress: Not on file   Social Connections: Unknown (5/18/2024)    Received from [facility] Short Social Needs Screening - Social Connection     Would you like help with any of the following needs: food, medicine/medical supplies, transportation, loneliness, housing or utilities?: Not on file   Intimate Partner Violence: Not on file   Housing Stability: Not on file     Family History[2]  Past Medical History[3]     Review of Systems   Respiratory: Negative.    Cardiovascular: Negative.    Gastrointestinal: Negative for constipation and diarrhea.   Genitourinary: Negative for difficulty urinating, pelvic pain, vaginal bleeding, vaginal discharge, itching or odor.    O:  Blood pressure 124/76, height 5' 4.75\" (1.645 m), weight 76.2 kg (168 lb).    Patient appears well and is not in distress  Neck is supple without masses  Breasts are symmetrical without mass, tenderness, nipple discharge, skin changes or adenopathy.   Abdomen is soft and nontender without masses.   Vulva without lesions or rashes.  Urethral meatus and urethra are normal  Bladder is normal to palpation  Vagina is normal without discharge or bleeding.   Cervix is normal without discharge or lesion.   Uterus and adnexa are normal, mobile, nontender without palpable mass.  Rectovaginal exam without nodularity/masses/visible blood on glove         [1]   Current Outpatient Medications:     amLODIPine (NORVASC) 5 mg tablet, , Disp: , Rfl:     Cequa 0.09 % SOLN, if needed, Disp: , Rfl:     [START ON 6/19/2025] estradiol (ESTRACE VAGINAL) 0.1 mg/g vaginal cream, Insert 1 g into the vagina 2 (two) times a week, Disp: 42.5 g, Rfl: 3    ezetimibe (ZETIA) 10 mg tablet, , " Disp: , Rfl:     levothyroxine 50 mcg tablet, in the morning., Disp: , Rfl:     Vitamin D, Cholecalciferol, 50 MCG (2000 UT) CAPS, Take by mouth, Disp: , Rfl:     Vitamin E 100 units TABS, Take by mouth, Disp: , Rfl:   [2]   Family History  Problem Relation Name Age of Onset    Osteoporosis Mother Mckay Demarco     Arthritis Mother Mckay Demarco     Osteoarthritis Mother Mckay Demarco     Diabetes Father Radha Demarco     Heart attack Father Radha Demarco     Hypertension Father Radha Demarco    [3]   Past Medical History:  Diagnosis Date    Disease of thyroid gland     Hypertension     Hypothyroidism     Miscarriage

## 2025-06-18 ENCOUNTER — ANNUAL EXAM (OUTPATIENT)
Dept: OBGYN CLINIC | Facility: CLINIC | Age: 70
End: 2025-06-18
Payer: COMMERCIAL

## 2025-06-18 VITALS
HEIGHT: 65 IN | SYSTOLIC BLOOD PRESSURE: 124 MMHG | BODY MASS INDEX: 27.99 KG/M2 | DIASTOLIC BLOOD PRESSURE: 76 MMHG | WEIGHT: 168 LBS

## 2025-06-18 DIAGNOSIS — Z01.419 ENCOUNTER FOR ANNUAL ROUTINE GYNECOLOGICAL EXAMINATION: Primary | ICD-10-CM

## 2025-06-18 DIAGNOSIS — Z78.0 MENOPAUSE: ICD-10-CM

## 2025-06-18 PROCEDURE — S0612 ANNUAL GYNECOLOGICAL EXAMINA: HCPCS | Performed by: STUDENT IN AN ORGANIZED HEALTH CARE EDUCATION/TRAINING PROGRAM

## 2025-06-18 RX ORDER — ESTRADIOL 0.1 MG/G
1 CREAM VAGINAL 2 TIMES WEEKLY
Qty: 42.5 G | Refills: 3 | Status: SHIPPED | OUTPATIENT
Start: 2025-06-19